# Patient Record
Sex: MALE | URBAN - METROPOLITAN AREA
[De-identification: names, ages, dates, MRNs, and addresses within clinical notes are randomized per-mention and may not be internally consistent; named-entity substitution may affect disease eponyms.]

---

## 2023-05-03 ENCOUNTER — HOME HEALTH ADMISSION (OUTPATIENT)
Dept: HOME HEALTH SERVICES | Facility: HOME HEALTH | Age: 88
End: 2023-05-03
Payer: MEDICARE

## 2023-05-04 ENCOUNTER — HOME CARE VISIT (OUTPATIENT)
Dept: SCHEDULING | Facility: HOME HEALTH | Age: 88
End: 2023-05-04
Payer: MEDICARE

## 2023-05-04 VITALS
SYSTOLIC BLOOD PRESSURE: 144 MMHG | HEART RATE: 75 BPM | TEMPERATURE: 97.9 F | RESPIRATION RATE: 16 BRPM | DIASTOLIC BLOOD PRESSURE: 80 MMHG | OXYGEN SATURATION: 99 %

## 2023-05-04 PROCEDURE — G0299 HHS/HOSPICE OF RN EA 15 MIN: HCPCS

## 2023-05-04 ASSESSMENT — ENCOUNTER SYMPTOMS
PAIN LOCATION - PAIN QUALITY: ACHES
DYSPNEA ACTIVITY LEVEL: AFTER AMBULATING MORE THAN 20 FT

## 2023-05-04 NOTE — HOME HEALTH
Problem: Patient admitted to [facility] AT [facility] S/P Numerous falls with minor injuries chest contusion, lacerations resolved, S/P hospitalization. PMH: Poor safety awarness, CAD. Pacer, HLD, Gerd, BPH,Lami, RTHR, cognitive impairment. Intervention: [facility] SN SOC: Patient recently moved to [facility] from [address] [place] [address] for functional decline, poor safety awarenss, numerous falls, alert and oriented x3, mildy forgetful at times, poor safety awareness. Ambulatory with walker with supervision and assistance as needed, weakness, unsteady gait, fall risk, deconditioning, acute on chronic pain R/T Sciatica, OA, 2-8/10, aches with activity, treated with rest/medications per MAR. Will have PT/OT evaluation and treatment. VS/SATS WNL. Medications reconciled, reviewed with patient, high risk medication Norco, needs renforcement. Instructed on [facility] AT [facility] SOC when to call/911, safety/fall/pressure injury/infection/Opioid precautions, medications/disease management, pain, and plan of care, taught back understanding, continue to reninforce. Goal: Patient will be safe at home free from falls/injury/infection, pain controlled, rehabilitate to optimal level of function, free from complications of Opioid use.

## 2023-05-05 ENCOUNTER — HOME CARE VISIT (OUTPATIENT)
Dept: HOME HEALTH SERVICES | Facility: HOME HEALTH | Age: 88
End: 2023-05-05
Payer: MEDICARE

## 2023-05-05 PROCEDURE — G0151 HHCP-SERV OF PT,EA 15 MIN: HCPCS

## 2023-05-05 PROCEDURE — G0152 HHCP-SERV OF OT,EA 15 MIN: HCPCS

## 2023-05-09 ENCOUNTER — HOME CARE VISIT (OUTPATIENT)
Dept: HOME HEALTH SERVICES | Facility: HOME HEALTH | Age: 88
End: 2023-05-09
Payer: MEDICARE

## 2023-05-09 VITALS — SYSTOLIC BLOOD PRESSURE: 108 MMHG | TEMPERATURE: 96.9 F | DIASTOLIC BLOOD PRESSURE: 59 MMHG

## 2023-05-09 VITALS — HEART RATE: 86 BPM | SYSTOLIC BLOOD PRESSURE: 109 MMHG | DIASTOLIC BLOOD PRESSURE: 80 MMHG

## 2023-05-09 PROCEDURE — G0152 HHCP-SERV OF OT,EA 15 MIN: HCPCS

## 2023-05-09 PROCEDURE — G0157 HHC PT ASSISTANT EA 15: HCPCS

## 2023-05-11 ENCOUNTER — HOME CARE VISIT (OUTPATIENT)
Dept: HOME HEALTH SERVICES | Facility: HOME HEALTH | Age: 88
End: 2023-05-11
Payer: MEDICARE

## 2023-05-11 PROCEDURE — G0157 HHC PT ASSISTANT EA 15: HCPCS

## 2023-05-12 ENCOUNTER — HOME CARE VISIT (OUTPATIENT)
Dept: SCHEDULING | Facility: HOME HEALTH | Age: 88
End: 2023-05-12
Payer: MEDICARE

## 2023-05-12 VITALS
RESPIRATION RATE: 16 BRPM | TEMPERATURE: 97.7 F | HEART RATE: 76 BPM | SYSTOLIC BLOOD PRESSURE: 102 MMHG | OXYGEN SATURATION: 99 % | DIASTOLIC BLOOD PRESSURE: 62 MMHG

## 2023-05-12 PROCEDURE — G0299 HHS/HOSPICE OF RN EA 15 MIN: HCPCS

## 2023-05-13 ASSESSMENT — ENCOUNTER SYMPTOMS: DYSPNEA ACTIVITY LEVEL: AFTER AMBULATING MORE THAN 20 FT

## 2023-05-15 ENCOUNTER — HOME CARE VISIT (OUTPATIENT)
Dept: HOME HEALTH SERVICES | Facility: HOME HEALTH | Age: 88
End: 2023-05-15
Payer: MEDICARE

## 2023-05-15 PROCEDURE — G0158 HHC OT ASSISTANT EA 15: HCPCS

## 2023-05-17 ENCOUNTER — HOME CARE VISIT (OUTPATIENT)
Dept: SCHEDULING | Facility: HOME HEALTH | Age: 88
End: 2023-05-17
Payer: MEDICARE

## 2023-05-17 VITALS
HEART RATE: 80 BPM | DIASTOLIC BLOOD PRESSURE: 77 MMHG | SYSTOLIC BLOOD PRESSURE: 130 MMHG | SYSTOLIC BLOOD PRESSURE: 115 MMHG | SYSTOLIC BLOOD PRESSURE: 96 MMHG | HEART RATE: 77 BPM | OXYGEN SATURATION: 95 % | HEART RATE: 92 BPM | OXYGEN SATURATION: 98 % | DIASTOLIC BLOOD PRESSURE: 84 MMHG | DIASTOLIC BLOOD PRESSURE: 62 MMHG

## 2023-05-17 VITALS
RESPIRATION RATE: 16 BRPM | HEART RATE: 74 BPM | TEMPERATURE: 97.6 F | SYSTOLIC BLOOD PRESSURE: 110 MMHG | OXYGEN SATURATION: 97 % | DIASTOLIC BLOOD PRESSURE: 60 MMHG

## 2023-05-17 PROCEDURE — G0299 HHS/HOSPICE OF RN EA 15 MIN: HCPCS

## 2023-05-17 ASSESSMENT — ENCOUNTER SYMPTOMS: DYSPNEA ACTIVITY LEVEL: AFTER AMBULATING MORE THAN 20 FT

## 2023-05-17 NOTE — HOME HEALTH
Problem: Patient admitted to [facility] S/P Numerous falls with minor injuries chest contusion, lacerations resolved, S/P hospitalization. Pain controlled with current treatment, acute on chronic pain R/T Sciatica, OA, 2-8/10 can be 10/10 at times, aches with activity, treated with rest/medications per [facility] PT/OT following. Discussed pain management at [address], patient would like to see pain management for back injections again. Intervention: Working with therapy to meet goals, tolerates well, pain controlled, VS/SATS WNL, voices no complaints at this time. Instructed on, safety/fall/pressure injury/infection/Opioid precautions, medications/disease management, pain, and plan of care, taught back understanding, continue to reninforce. Goal: Patient will be safe at home free from falls/injury/infection, pain controlled, rehabilitate to optimal level of function, free from complications of Opioid use.

## 2023-05-18 ENCOUNTER — HOME CARE VISIT (OUTPATIENT)
Dept: HOME HEALTH SERVICES | Facility: HOME HEALTH | Age: 88
End: 2023-05-18
Payer: MEDICARE

## 2023-05-18 PROCEDURE — G0157 HHC PT ASSISTANT EA 15: HCPCS

## 2023-05-19 ENCOUNTER — HOME CARE VISIT (OUTPATIENT)
Dept: HOME HEALTH SERVICES | Facility: HOME HEALTH | Age: 88
End: 2023-05-19
Payer: MEDICARE

## 2023-05-19 PROCEDURE — G0151 HHCP-SERV OF PT,EA 15 MIN: HCPCS

## 2023-05-22 ENCOUNTER — HOME CARE VISIT (OUTPATIENT)
Dept: HOME HEALTH SERVICES | Facility: HOME HEALTH | Age: 88
End: 2023-05-22
Payer: MEDICARE

## 2023-05-22 ENCOUNTER — HOME CARE VISIT (OUTPATIENT)
Dept: SCHEDULING | Facility: HOME HEALTH | Age: 88
End: 2023-05-22
Payer: MEDICARE

## 2023-05-22 VITALS
TEMPERATURE: 97.8 F | HEART RATE: 60 BPM | RESPIRATION RATE: 16 BRPM | SYSTOLIC BLOOD PRESSURE: 122 MMHG | DIASTOLIC BLOOD PRESSURE: 70 MMHG | OXYGEN SATURATION: 99 %

## 2023-05-22 PROCEDURE — G0152 HHCP-SERV OF OT,EA 15 MIN: HCPCS

## 2023-05-22 PROCEDURE — G0299 HHS/HOSPICE OF RN EA 15 MIN: HCPCS

## 2023-05-22 ASSESSMENT — ENCOUNTER SYMPTOMS: DYSPNEA ACTIVITY LEVEL: AFTER AMBULATING MORE THAN 20 FT

## 2023-05-22 NOTE — HOME HEALTH
Problem: Patient admitted to [facility] S/P hospitalization. Pain controlled with difficulty, acute on chronic pain R/T Sciatica, OA, 2-8/10 can be 8/10 at times, aches with activity, treated with rest/medications per [facility] PT/OT following. Patient would like to see pain management again for back injections. Intervention: Working with therapy to meet goals, tolerates well, pain controlled, VS/SATS WNL, voices no complaints at this time. Instructed on, safety/fall/pressure injury/infection/Opioid precautions, medications/disease management, pain, and plan of care, taught back understanding, continue to reninforce. Goal: Patient will be safe at home free from falls/injury/infection, pain controlled, rehabilitate to optimal level of function, free from complications of Opioid use.

## 2023-05-23 ENCOUNTER — HOME CARE VISIT (OUTPATIENT)
Dept: HOME HEALTH SERVICES | Facility: HOME HEALTH | Age: 88
End: 2023-05-23
Payer: MEDICARE

## 2023-05-23 VITALS
SYSTOLIC BLOOD PRESSURE: 103 MMHG | DIASTOLIC BLOOD PRESSURE: 66 MMHG | DIASTOLIC BLOOD PRESSURE: 67 MMHG | SYSTOLIC BLOOD PRESSURE: 126 MMHG | HEART RATE: 67 BPM

## 2023-05-23 PROCEDURE — G0157 HHC PT ASSISTANT EA 15: HCPCS

## 2023-05-24 ENCOUNTER — HOME CARE VISIT (OUTPATIENT)
Dept: HOME HEALTH SERVICES | Facility: HOME HEALTH | Age: 88
End: 2023-05-24
Payer: MEDICARE

## 2023-05-24 PROCEDURE — G0151 HHCP-SERV OF PT,EA 15 MIN: HCPCS

## 2023-06-01 ENCOUNTER — HOME CARE VISIT (OUTPATIENT)
Dept: SCHEDULING | Facility: HOME HEALTH | Age: 88
End: 2023-06-01
Payer: MEDICARE

## 2023-06-01 VITALS
SYSTOLIC BLOOD PRESSURE: 102 MMHG | DIASTOLIC BLOOD PRESSURE: 60 MMHG | DIASTOLIC BLOOD PRESSURE: 60 MMHG | OXYGEN SATURATION: 98 % | DIASTOLIC BLOOD PRESSURE: 61 MMHG | SYSTOLIC BLOOD PRESSURE: 122 MMHG | HEART RATE: 72 BPM | TEMPERATURE: 97.1 F | TEMPERATURE: 97.2 F | HEART RATE: 75 BPM | OXYGEN SATURATION: 97 % | HEART RATE: 74 BPM | OXYGEN SATURATION: 97 % | SYSTOLIC BLOOD PRESSURE: 115 MMHG

## 2023-06-01 PROCEDURE — G0299 HHS/HOSPICE OF RN EA 15 MIN: HCPCS

## 2023-06-05 VITALS
RESPIRATION RATE: 18 BRPM | SYSTOLIC BLOOD PRESSURE: 100 MMHG | TEMPERATURE: 97.5 F | OXYGEN SATURATION: 95 % | DIASTOLIC BLOOD PRESSURE: 60 MMHG | HEART RATE: 70 BPM

## 2023-06-05 ASSESSMENT — ENCOUNTER SYMPTOMS
STOOL DESCRIPTION: FORMED
DYSPNEA ACTIVITY LEVEL: AFTER AMBULATING 10 - 20 FT

## 2023-06-05 NOTE — HOME HEALTH
Pt has [facility] goals and remained safe free from injuries / falls/ infection. Pt has rehab to his optimal level of functioning he will remain in [facility] where his ADL/IADLs will be met.

## 2023-07-19 ENCOUNTER — HOME HEALTH ADMISSION (OUTPATIENT)
Dept: HOME HEALTH SERVICES | Facility: HOME HEALTH | Age: 88
End: 2023-07-19
Payer: MEDICARE

## 2023-07-20 ENCOUNTER — HOME CARE VISIT (OUTPATIENT)
Dept: SCHEDULING | Facility: HOME HEALTH | Age: 88
End: 2023-07-20
Payer: MEDICARE

## 2023-07-20 VITALS
RESPIRATION RATE: 18 BRPM | OXYGEN SATURATION: 98 % | HEART RATE: 68 BPM | TEMPERATURE: 98.1 F | DIASTOLIC BLOOD PRESSURE: 60 MMHG | SYSTOLIC BLOOD PRESSURE: 80 MMHG

## 2023-07-20 PROCEDURE — G0299 HHS/HOSPICE OF RN EA 15 MIN: HCPCS

## 2023-07-20 RX ADMIN — GENTAMICIN SULFATE 120 MG: 40 INJECTION, SOLUTION INTRAMUSCULAR; INTRAVENOUS at 09:15

## 2023-07-21 ENCOUNTER — HOME CARE VISIT (OUTPATIENT)
Dept: SCHEDULING | Facility: HOME HEALTH | Age: 88
End: 2023-07-21
Payer: MEDICARE

## 2023-07-21 VITALS
OXYGEN SATURATION: 99 % | HEART RATE: 70 BPM | TEMPERATURE: 98.7 F | SYSTOLIC BLOOD PRESSURE: 118 MMHG | DIASTOLIC BLOOD PRESSURE: 60 MMHG | RESPIRATION RATE: 18 BRPM

## 2023-07-21 PROCEDURE — G0299 HHS/HOSPICE OF RN EA 15 MIN: HCPCS

## 2023-07-21 RX ADMIN — GENTAMICIN SULFATE 120 MG: 40 INJECTION, SOLUTION INTRAMUSCULAR; INTRAVENOUS at 08:55

## 2023-07-21 ASSESSMENT — ENCOUNTER SYMPTOMS
STOOL DESCRIPTION: FORMED
PAIN LOCATION - PAIN QUALITY: THROBBING
PAIN LOCATION - PAIN QUALITY: SHOOTING

## 2023-07-21 NOTE — HOME HEALTH
Problem: UTI, Cognitive Deficits, HTN,     Intervention: Pt seen today for nursing assessment and administration of ABX for treatment of newly dx UTI. Pt very pleasant and corperative with writer for assessment. Gentamycin 120MG/3ML IM via Z-track method Left Hip. Pt tolerated well without complications or concerns. This is his 2nd dose. Goals: Next visit will be 3rd and last dose of ABX.

## 2023-07-21 NOTE — HOME HEALTH
Problem: UTI,     Intervention: Pt seen for admit to home care, recent dx of UTI, Pt was unaware he had one at time of admission. He is pleasant and coeperative with writer for assessment. He resides in an [facility] and they manage all his medications. They also assist with his meal preps. He is independent with his ADLs and uses a walker for ambulation. He is able to make his own needs known. However he is forgetful and has slight cognitive deficits. Pt has been reported to be alittle more confused since recent UTI. Skin is W/D/I. Lungs CTA, Denied SOB. Pain continues in Lower back and right hip 3/10. Current pain regimen is effective with decreasing pain 0/10. HRRR. Abd soft and non tender to palpate. Denies problems with constipation/diarrhea. Appetie is reported good. Appears well nourished and well hydrated. No pedal edema noted. Pedal pulses present. Neg Homans. Plan SNV 1 x QD X 3 for Gentamycin 120MG/3ML IM QD X 3D, then decrease to 1w2 to asses effectiveness of medications. Goals: Pts UTI will resolve. Pt will be free from infection/injuries/falls.

## 2023-07-22 ENCOUNTER — HOME CARE VISIT (OUTPATIENT)
Dept: SCHEDULING | Facility: HOME HEALTH | Age: 88
End: 2023-07-22
Payer: MEDICARE

## 2023-07-22 VITALS
HEART RATE: 66 BPM | DIASTOLIC BLOOD PRESSURE: 60 MMHG | TEMPERATURE: 98.1 F | RESPIRATION RATE: 18 BRPM | OXYGEN SATURATION: 98 % | SYSTOLIC BLOOD PRESSURE: 84 MMHG

## 2023-07-22 PROCEDURE — G0299 HHS/HOSPICE OF RN EA 15 MIN: HCPCS

## 2023-07-22 RX ADMIN — GENTAMICIN SULFATE 120 MG: 40 INJECTION, SOLUTION INTRAMUSCULAR; INTRAVENOUS at 07:30

## 2023-07-22 ASSESSMENT — ENCOUNTER SYMPTOMS
STOOL DESCRIPTION: FORMED
PAIN LOCATION - PAIN QUALITY: THROBBING

## 2023-07-22 NOTE — HOME HEALTH
Problem: UTI, Cognitive Deficits, HTN,          Intervention: Pt seen today for nursing assessment and administration of ABX for treatment of newly dx UTI. Pt very pleasant and corperative with writer for assessment. Gentamycin 120MG/3ML IM via Z-track method Right Hip. Pt tolerated well without complications or concerns. This is his 2nd dose. Goals: Next visit will be 3rd and last dose of ABX.

## 2023-07-23 ENCOUNTER — HOME CARE VISIT (OUTPATIENT)
Dept: HOME HEALTH SERVICES | Facility: HOME HEALTH | Age: 88
End: 2023-07-23
Payer: MEDICARE

## 2023-07-23 PROCEDURE — G0299 HHS/HOSPICE OF RN EA 15 MIN: HCPCS

## 2023-08-02 ENCOUNTER — HOME CARE VISIT (OUTPATIENT)
Dept: SCHEDULING | Facility: HOME HEALTH | Age: 88
End: 2023-08-02
Payer: MEDICARE

## 2023-08-02 VITALS
RESPIRATION RATE: 18 BRPM | TEMPERATURE: 97.9 F | DIASTOLIC BLOOD PRESSURE: 60 MMHG | OXYGEN SATURATION: 98 % | HEART RATE: 70 BPM | SYSTOLIC BLOOD PRESSURE: 90 MMHG

## 2023-08-02 PROCEDURE — G0299 HHS/HOSPICE OF RN EA 15 MIN: HCPCS

## 2023-08-02 ASSESSMENT — ENCOUNTER SYMPTOMS: STOOL DESCRIPTION: FORMED

## 2023-08-09 VITALS
SYSTOLIC BLOOD PRESSURE: 100 MMHG | RESPIRATION RATE: 18 BRPM | OXYGEN SATURATION: 98 % | DIASTOLIC BLOOD PRESSURE: 60 MMHG | HEART RATE: 70 BPM | TEMPERATURE: 97.7 F

## 2023-08-09 ASSESSMENT — ENCOUNTER SYMPTOMS: STOOL DESCRIPTION: FORMED

## 2023-08-09 NOTE — HOME HEALTH
Problem: UTI, Cognitive Deficits, HTN,     Pt has been recieving 3 IM injections Gentamycin 120MG ABX therapy completed He has tolerated treatments well. He is unable to verbalize any change in sxs of UTI d/t his advanced Dementia. However no behaviors have been reported by staff    Goals: Pt will be DC if pt remians free from infection/injuires/falls.